# Patient Record
Sex: FEMALE | Race: WHITE | HISPANIC OR LATINO
[De-identification: names, ages, dates, MRNs, and addresses within clinical notes are randomized per-mention and may not be internally consistent; named-entity substitution may affect disease eponyms.]

---

## 2024-09-18 ENCOUNTER — APPOINTMENT (OUTPATIENT)
Dept: ORTHOPEDIC SURGERY | Facility: CLINIC | Age: 44
End: 2024-09-18
Payer: OTHER MISCELLANEOUS

## 2024-09-18 ENCOUNTER — RESULT CHARGE (OUTPATIENT)
Age: 44
End: 2024-09-18

## 2024-09-18 VITALS — WEIGHT: 187 LBS | HEIGHT: 63 IN | BODY MASS INDEX: 33.13 KG/M2

## 2024-09-18 DIAGNOSIS — M76.72 PERONEAL TENDINITIS, LEFT LEG: ICD-10-CM

## 2024-09-18 DIAGNOSIS — Z86.39 PERSONAL HISTORY OF OTHER ENDOCRINE, NUTRITIONAL AND METABOLIC DISEASE: ICD-10-CM

## 2024-09-18 DIAGNOSIS — S90.32XA CONTUSION OF LEFT FOOT, INITIAL ENCOUNTER: ICD-10-CM

## 2024-09-18 DIAGNOSIS — Z78.9 OTHER SPECIFIED HEALTH STATUS: ICD-10-CM

## 2024-09-18 PROBLEM — Z00.00 ENCOUNTER FOR PREVENTIVE HEALTH EXAMINATION: Status: ACTIVE | Noted: 2024-09-18

## 2024-09-18 PROCEDURE — 99203 OFFICE O/P NEW LOW 30 MIN: CPT

## 2024-09-18 NOTE — HISTORY OF PRESENT ILLNESS
[Work related] : work related [Sudden] : sudden [8] : 8 [3] : 3 [Dull/Aching] : dull/aching [Sharp] : sharp [Throbbing] : throbbing [Constant] : constant [Household chores] : household chores [Leisure] : leisure [Work] : work [Social interactions] : social interactions [Rest] : rest [Full time] : Work status: full time [de-identified] : Patient here for left ankle. Patient states a heavy box fell on her foot at work on 9/13/2024. Patient states she has sharp and aching pain in the Peroneal tendon that is constant. Patient came into office weight bearing in flip flops.  [] : Post Surgical Visit: no [FreeTextEntry1] : Left ankle  [FreeTextEntry3] : 9/13/2024 [FreeTextEntry5] : Patient states a heavy box fell on her foot  [de-identified] : Movement  [de-identified] :  for [employer] store

## 2024-09-18 NOTE — PHYSICAL EXAM
[de-identified] : Examination of the left foot and ankle is as follows: INSPECTION: mild swelling, no ecchymosis of foot and ankle, no abrasion, no laceration, no erythema PALPATION: mild peroneal tenderness from proximal to distal ROM: plantarflexion 40 degrees, inversion 15 degrees, eversion 10 degrees, dorsiflexion 10 degrees STRENGTH: dorsiflexion 4/5, plantar flexion 4/5, inversion 4/5, eversion 4/5, EHL 5/5, FHL 5/5 VASCULAR: dorsalis pedis pulse: 2+, posterior tibialis pulse: 2+ NEURO: Sensation present to light touch in all distributions GAIT: antalgic, ambulation without assisted devices  X-rays of the left ankle is as follows:  Ankle 3 view: No fractures, subluxations or dislocations. No major abnormalities.

## 2024-09-18 NOTE — ASSESSMENT
[FreeTextEntry1] : 44yoF with left foot contusion and peroneal tendonitis.  Recommend nonsurgical management.  -WC light duty at this time. -No strenuous activities -Rest, ice, compression, elevation, NSAIDs PRN for pain.  -All questions answered -F/u 4 weeks  The patient was advised to consult with their primary medical provider prior to initiation of any new medications to reduce the risk of adverse effects specific to their long-term home medications and medical history. The risk of gastrointestinal irritation and kidney injury specific to long-term NSAID use was discussed.

## 2024-10-01 ENCOUNTER — APPOINTMENT (OUTPATIENT)
Dept: ORTHOPEDIC SURGERY | Facility: CLINIC | Age: 44
End: 2024-10-01
Payer: OTHER MISCELLANEOUS

## 2024-10-01 ENCOUNTER — NON-APPOINTMENT (OUTPATIENT)
Age: 44
End: 2024-10-01

## 2024-10-01 DIAGNOSIS — M76.822 POSTERIOR TIBIAL TENDINITIS, LEFT LEG: ICD-10-CM

## 2024-10-01 DIAGNOSIS — S90.32XD CONTUSION OF LEFT FOOT, SUBSEQUENT ENCOUNTER: ICD-10-CM

## 2024-10-01 DIAGNOSIS — M76.72 PERONEAL TENDINITIS, LEFT LEG: ICD-10-CM

## 2024-10-01 PROCEDURE — 99203 OFFICE O/P NEW LOW 30 MIN: CPT

## 2024-10-01 NOTE — PHYSICAL EXAM
[de-identified] : Examination of the left foot and ankle is as follows: INSPECTION: pes planus, hindfoot valgus, mild swelling of dorsal foot, no ecchymosis of foot and ankle, no abrasion, no laceration, no erythema PALPATION: ttp over anterior ankle joint line, ttp over 3-5th metatarsal shafts, ttp over lateral ankle ligaments, mildly ttp over peroneal tendon ROM: plantarflexion 40 degrees, inversion 15 degrees, eversion 10 degrees, dorsiflexion 10 degrees STRENGTH: dorsiflexion 4/5, plantar flexion 4/5, inversion 4/5, eversion 4/5, EHL 5/5, FHL 5/5 TESTING: + anterior drawer, hindfoot goes into varus with attempted heel raise VASCULAR: dorsalis pedis pulse: 2+, posterior tibialis pulse: 2+ NEURO: Sensation present to light touch in all distributions GAIT: antalgic, ambulation without assisted devices  X-rays of the left ankle is as follows:  Ankle 3 view: No fractures, subluxations or dislocations. No major abnormalities.

## 2024-10-01 NOTE — HISTORY OF PRESENT ILLNESS
[Work related] : work related [Sudden] : sudden [Dull/Aching] : dull/aching [Sharp] : sharp [Throbbing] : throbbing [Constant] : constant [Household chores] : household chores [Leisure] : leisure [Work] : work [Social interactions] : social interactions [Rest] : rest [Full time] : Work status: full time [10] : 10 [6] : 6 [de-identified] : Patient here for left ankle pain following a work injury on 09/13/24. Patient is being treated for a left foot contusion and peroneal tendinitis of the left lower leg. Patient states she has intermittent sharp and aching pain over lateral aspect of her ankle, now noting new pain over medial aspect of her ankle that is worse with weightbearing. Patient notes associated swelling. Patient notes that she is taking Tylenol PRN for pain with mild improvement. Patient came into office weight bearing wearing regular shoes. Patient has been working on a light duty status. [] : Post Surgical Visit: no [FreeTextEntry1] : Left ankle  [FreeTextEntry3] : 9/13/2024 [FreeTextEntry5] : Patient states a heavy box fell on her foot  [de-identified] : Movement  [de-identified] :  for [employer] store

## 2024-10-01 NOTE — ASSESSMENT
[FreeTextEntry1] : 45 yo female presenting today for follow up of left foot contusion and peroneal tendinitis. Patient noting that her pain is worsening despite modifying her activities and taking OTC Tylenol and continues to have persistent pain. -Rx MRI left foot and ankle w/o contrast to r/o stress fractures, ligamentous injury, peroneal tendon tear -Activities as tolerated -Patient may continue to work light duty -Rest, ice, compression, elevation, NSAIDs PRN for pain.  -All questions answered -F/u after MRIs  The diagnosis was explained in detail. The potential non-surgical and surgical treatments were reviewed. The relative risks and benefits of each option were considered relative to the patients age, activity level, medical history, symptom severity and previously attempted treatments.  The patient was advised to consult with their primary medical provider prior to initiation of any new medications to reduce the risk of adverse effects specific to their long-term home medications and medical history. The risk of gastrointestinal irritation and kidney injury specific to long-term NSAID use was discussed.  Entered by Bc Bravo PA-C acting as scribe. Dr. Handley Attestation The documentation recorded by the scribe, in my presence, accurately reflects the service I, Dr. Handley, personally performed, and the decisions made by me with my edits as appropriate.

## 2024-10-16 ENCOUNTER — RESULT REVIEW (OUTPATIENT)
Age: 44
End: 2024-10-16

## 2024-10-23 ENCOUNTER — NON-APPOINTMENT (OUTPATIENT)
Age: 44
End: 2024-10-23

## 2024-10-23 ENCOUNTER — RESULT REVIEW (OUTPATIENT)
Age: 44
End: 2024-10-23

## 2024-10-24 ENCOUNTER — APPOINTMENT (OUTPATIENT)
Dept: ORTHOPEDIC SURGERY | Facility: CLINIC | Age: 44
End: 2024-10-24
Payer: OTHER MISCELLANEOUS

## 2024-10-24 ENCOUNTER — NON-APPOINTMENT (OUTPATIENT)
Age: 44
End: 2024-10-24

## 2024-10-24 DIAGNOSIS — M77.52 OTHER ENTHESOPATHY OF LT FOOT AND ANKLE: ICD-10-CM

## 2024-10-24 DIAGNOSIS — M25.872 OTHER SPECIFIED JOINT DISORDERS, LEFT ANKLE AND FOOT: ICD-10-CM

## 2024-10-24 PROCEDURE — 99214 OFFICE O/P EST MOD 30 MIN: CPT
